# Patient Record
Sex: FEMALE | Race: WHITE | NOT HISPANIC OR LATINO | Employment: UNEMPLOYED | URBAN - METROPOLITAN AREA
[De-identification: names, ages, dates, MRNs, and addresses within clinical notes are randomized per-mention and may not be internally consistent; named-entity substitution may affect disease eponyms.]

---

## 2020-01-05 ENCOUNTER — OFFICE VISIT (OUTPATIENT)
Dept: URGENT CARE | Facility: CLINIC | Age: 70
End: 2020-01-05
Payer: MEDICARE

## 2020-01-05 VITALS
BODY MASS INDEX: 25.87 KG/M2 | HEIGHT: 63 IN | HEART RATE: 78 BPM | SYSTOLIC BLOOD PRESSURE: 150 MMHG | DIASTOLIC BLOOD PRESSURE: 74 MMHG | OXYGEN SATURATION: 98 % | WEIGHT: 146 LBS | TEMPERATURE: 98.3 F | RESPIRATION RATE: 16 BRPM

## 2020-01-05 DIAGNOSIS — T23.131A SUPERFICIAL BURN OF MULTIPLE FINGERS OF RIGHT HAND EXCLUDING THUMB, INITIAL ENCOUNTER: Primary | ICD-10-CM

## 2020-01-05 PROCEDURE — G0463 HOSPITAL OUTPT CLINIC VISIT: HCPCS | Performed by: PHYSICIAN ASSISTANT

## 2020-01-05 PROCEDURE — 99213 OFFICE O/P EST LOW 20 MIN: CPT | Performed by: PHYSICIAN ASSISTANT

## 2020-01-05 RX ORDER — PANTOPRAZOLE SODIUM 40 MG/1
TABLET, DELAYED RELEASE ORAL
COMMUNITY
Start: 2019-12-19

## 2020-01-05 RX ORDER — CETIRIZINE HYDROCHLORIDE 10 MG/1
10 TABLET ORAL DAILY
COMMUNITY

## 2020-01-05 RX ORDER — OMEGA-3/DHA/EPA/FISH OIL 60 MG-90MG
CAPSULE ORAL
COMMUNITY

## 2020-01-05 RX ORDER — DIPHENHYDRAMINE HCL 25 MG
25 TABLET ORAL EVERY 6 HOURS PRN
COMMUNITY

## 2020-01-05 RX ORDER — OMEGA-3S/DHA/EPA/FISH OIL/D3 300MG-1000
400 CAPSULE ORAL DAILY
COMMUNITY

## 2020-01-05 RX ORDER — VALSARTAN 80 MG/1
TABLET ORAL
COMMUNITY
Start: 2019-10-03

## 2020-01-05 RX ORDER — BACITRACIN, NEOMYCIN, POLYMYXIN B 400; 3.5; 5 [USP'U]/G; MG/G; [USP'U]/G
1 OINTMENT TOPICAL ONCE
Status: COMPLETED | OUTPATIENT
Start: 2020-01-05 | End: 2020-01-05

## 2020-01-05 RX ORDER — LANCETS 28 GAUGE
EACH MISCELLANEOUS
COMMUNITY
Start: 2019-10-04

## 2020-01-05 RX ORDER — SIMVASTATIN 20 MG
TABLET ORAL
COMMUNITY
Start: 2019-12-19

## 2020-01-05 RX ORDER — BLOOD-GLUCOSE METER
KIT MISCELLANEOUS
COMMUNITY
Start: 2019-12-20

## 2020-01-05 RX ORDER — ASPIRIN 81 MG/1
81 TABLET ORAL DAILY
COMMUNITY

## 2020-01-05 RX ORDER — NEOMYCIN SULFATE, POLYMYXIN B SULFATE, BACITRACIN ZINC, HYDROCORTISONE 3.5; 10000; 400; 1 MG/G; [USP'U]/G; [USP'U]/G; MG/G
OINTMENT OPHTHALMIC
COMMUNITY
Start: 2019-11-26

## 2020-01-05 RX ADMIN — BACITRACIN, NEOMYCIN, POLYMYXIN B 1 SMALL APPLICATION: 400; 3.5; 5 OINTMENT TOPICAL at 18:26

## 2020-01-05 NOTE — PROGRESS NOTES
NAME: Donnie Born is a 77 y o  female  : 1950    MRN: [id number]      Assessment and Plan   Superficial burn of multiple fingers of right hand excluding thumb, initial encounter [T23 131A]  1  Superficial burn of multiple fingers of right hand excluding thumb, initial encounter  neomycin-bacitracin-polymyxin b (NEOSPORIN) ointment 1 small application   Wound care provided during visit  Bacitracin applied during visit  Recommend use of OTC Neosporin     Recommend use of OTC Motrin  or Tylenol as needed for comfort  Discussed plans and visit summary with parents  Discussed plans and visit summary with patient  Patient verbalized understanding, all questions answered and patient in agreement  Educated patient that if signs and symptoms get worse go to ER  Administrations This Visit     neomycin-bacitracin-polymyxin b (NEOSPORIN) ointment 1 small application     Admin Date  2020 Action  Given Dose  1 small application Route  Topical Administered By  PIPER Bradshaw was seen today for hand burn  Diagnoses and all orders for this visit:    Superficial burn of multiple fingers of right hand excluding thumb, initial encounter  -     neomycin-bacitracin-polymyxin b (NEOSPORIN) ointment 1 small application        Patient Instructions   There are no Patient Instructions on file for this visit  Proceed to ER if symptoms worsen  Chief Complaint     Chief Complaint   Patient presents with    Hand Burn     R hand, 3rd, 4th, 5th digits; burned while cooking scalloped potatoes         History of Present Illness     78 Yo old Pt presents for burn on right hand x 2 hours  Patient reports while placing hand scalloped potatoes onto the shelf a portion of it fell on toe right hand   Pt reports immediatly placing hand into cold water and applying ice  Patient admits to tingling sensation  Pt rates pain 8/10  Denies numbess,  and weakness     LAST TETANUS SHOT = 2016      Review of Systems   Review of Systems   Constitutional: Negative  Respiratory: Negative  Cardiovascular: Negative  Skin: Positive for wound (Right hand)  Current Medications       Current Outpatient Medications:     aspirin (ECOTRIN LOW STRENGTH) 81 mg EC tablet, Take 81 mg by mouth daily, Disp: , Rfl:     b complex vitamins tablet, Take 1 tablet by mouth daily, Disp: , Rfl:     cetirizine (ZyrTEC) 10 mg tablet, Take 10 mg by mouth daily, Disp: , Rfl:     cholecalciferol (VITAMIN D3) 400 units tablet, Take 400 Units by mouth daily, Disp: , Rfl:     diphenhydrAMINE (BENADRYL) 25 mg tablet, Take 25 mg by mouth every 6 (six) hours as needed for itching, Disp: , Rfl:     FREESTYLE LITE test strip, CHECK FASTING GLUCOSE DAILY   E11 9, Disp: , Rfl:     Lancets (FREESTYLE) lancets, CHECK FASTING GLUCOSE DAILY   E11 9, Disp: , Rfl:     metFORMIN (GLUCOPHAGE) 500 mg tablet, , Disp: , Rfl:     Multiple Vitamins-Minerals (WOMENS MULTI PO), Take by mouth, Disp: , Rfl:     neomycin-bacitracin-polymyxin-hydrocortisone (CORTISPORIN) 1 % ophthalmic ointment, APPLY TO RIGHT LOWER LID THREE TIMES A DAY FOR 7-10 DAYS, Disp: , Rfl:     Omega-3 Fatty Acids (FISH OIL) 500 MG CAPS, Take by mouth, Disp: , Rfl:     pantoprazole (PROTONIX) 40 mg tablet, , Disp: , Rfl:     sertraline (ZOLOFT) 50 mg tablet, , Disp: , Rfl:     simvastatin (ZOCOR) 20 mg tablet, , Disp: , Rfl:     valsartan (DIOVAN) 80 mg tablet, , Disp: , Rfl:   No current facility-administered medications for this visit       Current Allergies     Allergies as of 01/05/2020    (No Known Allergies)              Past Medical History:   Diagnosis Date    Allergic     Anxiety     Depression     Diabetes mellitus (HCC)     GERD (gastroesophageal reflux disease)     Hyperlipidemia     Hypertension        Past Surgical History:   Procedure Laterality Date    HYSTERECTOMY      THYROID SURGERY      nodule removed    WISDOM TOOTH EXTRACTION         No family history on file  Medications have been verified  The following portions of the patient's history were reviewed and updated as appropriate: allergies, current medications, past family history, past medical history, past social history, past surgical history and problem list     Objective   /74   Pulse 78   Temp 98 3 °F (36 8 °C) (Tympanic)   Resp 16   Ht 5' 3" (1 6 m)   Wt 66 2 kg (146 lb)   SpO2 98%   BMI 25 86 kg/m²      Physical Exam     Physical Exam   Constitutional: She is oriented to person, place, and time  She appears well-developed and well-nourished  No distress  Cardiovascular: Normal rate, regular rhythm, normal heart sounds and intact distal pulses  Exam reveals no gallop and no friction rub  No murmur heard  Pulmonary/Chest: Effort normal and breath sounds normal  No stridor  No respiratory distress  She has no wheezes  She has no rales  She exhibits no tenderness  Musculoskeletal:        Right hand: She exhibits tenderness  She exhibits normal range of motion, no bony tenderness, normal two-point discrimination, normal capillary refill, no deformity, no laceration and no swelling  Normal sensation noted  Normal strength noted  Hands:  Neurological: She is alert and oriented to person, place, and time  No cranial nerve deficit  Skin: She is not diaphoretic  Nursing note and vitals reviewed        Lorenza Sorensen PA-C

## 2022-08-27 ENCOUNTER — HOSPITAL ENCOUNTER (OUTPATIENT)
Dept: RADIOLOGY | Facility: HOSPITAL | Age: 72
Discharge: HOME | End: 2022-08-27
Payer: MEDICARE

## 2022-08-27 DIAGNOSIS — R05.1 ACUTE COUGH: ICD-10-CM

## 2022-08-27 PROCEDURE — 71046 X-RAY EXAM CHEST 2 VIEWS: CPT
